# Patient Record
Sex: FEMALE | Race: OTHER
[De-identification: names, ages, dates, MRNs, and addresses within clinical notes are randomized per-mention and may not be internally consistent; named-entity substitution may affect disease eponyms.]

---

## 2020-02-15 ENCOUNTER — HOSPITAL ENCOUNTER (INPATIENT)
Dept: HOSPITAL 54 - MED | Age: 65
LOS: 5 days | Discharge: NURSING, REHAB OR LONG-TERM CARE | DRG: 854 | End: 2020-02-20
Attending: NURSE PRACTITIONER | Admitting: NURSE PRACTITIONER
Payer: MEDICARE

## 2020-02-15 VITALS — DIASTOLIC BLOOD PRESSURE: 75 MMHG | SYSTOLIC BLOOD PRESSURE: 137 MMHG

## 2020-02-15 VITALS — SYSTOLIC BLOOD PRESSURE: 117 MMHG | DIASTOLIC BLOOD PRESSURE: 71 MMHG

## 2020-02-15 VITALS — WEIGHT: 127 LBS | HEIGHT: 63 IN | BODY MASS INDEX: 22.5 KG/M2

## 2020-02-15 VITALS — DIASTOLIC BLOOD PRESSURE: 81 MMHG | SYSTOLIC BLOOD PRESSURE: 129 MMHG

## 2020-02-15 DIAGNOSIS — B95.61: ICD-10-CM

## 2020-02-15 DIAGNOSIS — F32.9: ICD-10-CM

## 2020-02-15 DIAGNOSIS — G47.00: ICD-10-CM

## 2020-02-15 DIAGNOSIS — L02.413: ICD-10-CM

## 2020-02-15 DIAGNOSIS — Z59.0: ICD-10-CM

## 2020-02-15 DIAGNOSIS — A41.9: Primary | ICD-10-CM

## 2020-02-15 DIAGNOSIS — J10.1: ICD-10-CM
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
## 2020-02-15 DIAGNOSIS — M00.9: ICD-10-CM

## 2020-02-15 DIAGNOSIS — Z83.3: ICD-10-CM

## 2020-02-15 DIAGNOSIS — E87.6: ICD-10-CM

## 2020-02-15 DIAGNOSIS — L03.113: ICD-10-CM

## 2020-02-15 DIAGNOSIS — Z80.41: ICD-10-CM

## 2020-02-15 DIAGNOSIS — Z79.899: ICD-10-CM

## 2020-02-15 DIAGNOSIS — D69.6: ICD-10-CM

## 2020-02-15 LAB
APPEARANCE UR: CLEAR
BILIRUB UR QL STRIP: NEGATIVE
COLOR UR: YELLOW
GLUCOSE UR STRIP-MCNC: NEGATIVE MG/DL
HGB UR QL STRIP: (no result) ERY/UL
KETONES UR STRIP-MCNC: NEGATIVE MG/DL
LEUKOCYTE ESTERASE UR QL STRIP: (no result)
NITRITE UR QL STRIP: POSITIVE
PH UR STRIP: 7 [PH] (ref 5–8)
PROT UR QL STRIP: (no result) MG/DL
RBC #/AREA URNS HPF: (no result) /HPF (ref 0–2)
UROBILINOGEN UR STRIP-MCNC: >=8 EU/DL
WBC #/AREA URNS HPF: (no result) /HPF
WBC #/AREA URNS HPF: (no result) /HPF (ref 0–3)

## 2020-02-15 PROCEDURE — A6403 STERILE GAUZE>16 <= 48 SQ IN: HCPCS

## 2020-02-15 PROCEDURE — A6253 ABSORPT DRG > 48 SQ IN W/O B: HCPCS

## 2020-02-15 PROCEDURE — G0378 HOSPITAL OBSERVATION PER HR: HCPCS

## 2020-02-15 PROCEDURE — A4216 STERILE WATER/SALINE, 10 ML: HCPCS

## 2020-02-15 PROCEDURE — A4217 STERILE WATER/SALINE, 500 ML: HCPCS

## 2020-02-15 PROCEDURE — A4565 SLINGS: HCPCS

## 2020-02-15 PROCEDURE — A6407 PACKING STRIPS, NON-IMPREG: HCPCS

## 2020-02-15 RX ADMIN — DEXTROSE MONOHYDRATE SCH MLS/HR: 50 INJECTION, SOLUTION INTRAVENOUS at 15:05

## 2020-02-15 RX ADMIN — SODIUM CHLORIDE PRN MLS/HR: 9 INJECTION, SOLUTION INTRAVENOUS at 14:30

## 2020-02-15 RX ADMIN — ENOXAPARIN SODIUM SCH MG: 40 INJECTION SUBCUTANEOUS at 14:35

## 2020-02-15 RX ADMIN — PIPERACILLIN SODIUM AND TAZOBACTAM SODIUM SCH MLS/HR: .25; 2 INJECTION, POWDER, LYOPHILIZED, FOR SOLUTION INTRAVENOUS at 14:32

## 2020-02-15 RX ADMIN — PIPERACILLIN SODIUM AND TAZOBACTAM SODIUM SCH MLS/HR: .25; 2 INJECTION, POWDER, LYOPHILIZED, FOR SOLUTION INTRAVENOUS at 20:57

## 2020-02-15 RX ADMIN — CITALOPRAM SCH MG: 20 TABLET ORAL at 21:37

## 2020-02-15 SDOH — ECONOMIC STABILITY - HOUSING INSECURITY: HOMELESSNESS: Z59.0

## 2020-02-15 NOTE — NUR
Recpatiencevd alert and orientated. States she spoke to the Doctor and is aware she is going for a 
procedurs for her right arm.She signed the consent Right arm is swollen and not able to take 
off her bracelet.  Will make note on the surgery check off list. She ambulated to the 
bathroom with a steady gait.  Stated she has pain in the right arm.

## 2020-02-15 NOTE — NUR
M/S RN NOTES



PATIENT AWAKE IN BED, NO RESPIRATORY DISTRESS, PAIN TOLERABLE AT THIS TIME. IV ACCESS SITE 
INTACT AND PATENT. SKIN WARM TO TOUCH. PATIENT'S NEEDS ATTENDED, BED ON LOWEST LOCKED 
POSITION, CALL LIGHT WITHIN REACH. WILL ENDORSE TO ONCOMING NURSE.

## 2020-02-16 VITALS — DIASTOLIC BLOOD PRESSURE: 85 MMHG | SYSTOLIC BLOOD PRESSURE: 137 MMHG

## 2020-02-16 VITALS — DIASTOLIC BLOOD PRESSURE: 82 MMHG | SYSTOLIC BLOOD PRESSURE: 132 MMHG

## 2020-02-16 VITALS — SYSTOLIC BLOOD PRESSURE: 160 MMHG | DIASTOLIC BLOOD PRESSURE: 99 MMHG

## 2020-02-16 LAB
BASOPHILS # BLD AUTO: 0 /CMM (ref 0–0.2)
BASOPHILS NFR BLD AUTO: 0.5 % (ref 0–2)
BUN SERPL-MCNC: 8 MG/DL (ref 7–18)
CALCIUM SERPL-MCNC: 8 MG/DL (ref 8.5–10.1)
CHLORIDE SERPL-SCNC: 103 MMOL/L (ref 98–107)
CHOLEST SERPL-MCNC: 80 MG/DL (ref ?–200)
CO2 SERPL-SCNC: 28 MMOL/L (ref 21–32)
CREAT SERPL-MCNC: 0.5 MG/DL (ref 0.6–1.3)
EOSINOPHIL NFR BLD AUTO: 2 % (ref 0–6)
GLUCOSE SERPL-MCNC: 113 MG/DL (ref 74–106)
HCT VFR BLD AUTO: 29 % (ref 33–45)
HDLC SERPL-MCNC: 26 MG/DL (ref 40–60)
HGB BLD-MCNC: 9.6 G/DL (ref 11.5–14.8)
LDLC SERPL DIRECT ASSAY-MCNC: 49 MG/DL (ref 0–99)
LYMPHOCYTES NFR BLD AUTO: 1.5 /CMM (ref 0.8–4.8)
LYMPHOCYTES NFR BLD AUTO: 21.6 % (ref 20–44)
MAGNESIUM SERPL-MCNC: 1.6 MG/DL (ref 1.8–2.4)
MCHC RBC AUTO-ENTMCNC: 34 G/DL (ref 31–36)
MCV RBC AUTO: 87 FL (ref 82–100)
MONOCYTES NFR BLD AUTO: 0.7 /CMM (ref 0.1–1.3)
MONOCYTES NFR BLD AUTO: 10.7 % (ref 2–12)
NEUTROPHILS # BLD AUTO: 4.5 /CMM (ref 1.8–8.9)
NEUTROPHILS NFR BLD AUTO: 65.2 % (ref 43–81)
PHOSPHATE SERPL-MCNC: 3.9 MG/DL (ref 2.5–4.9)
PLATELET # BLD AUTO: 479 /CMM (ref 150–450)
POTASSIUM SERPL-SCNC: 3.3 MMOL/L (ref 3.5–5.1)
RBC # BLD AUTO: 3.27 MIL/UL (ref 4–5.2)
SODIUM SERPL-SCNC: 138 MMOL/L (ref 136–145)
TRIGL SERPL-MCNC: 52 MG/DL (ref 30–150)
TSH SERPL DL<=0.005 MIU/L-ACNC: 1.07 UIU/ML (ref 0.36–3.74)
WBC NRBC COR # BLD AUTO: 6.9 K/UL (ref 4.3–11)

## 2020-02-16 PROCEDURE — 0R9L0ZZ DRAINAGE OF RIGHT ELBOW JOINT, OPEN APPROACH: ICD-10-PCS | Performed by: SPECIALIST

## 2020-02-16 PROCEDURE — 0RBJ4ZZ EXCISION OF RIGHT SHOULDER JOINT, PERCUTANEOUS ENDOSCOPIC APPROACH: ICD-10-PCS | Performed by: SPECIALIST

## 2020-02-16 RX ADMIN — Medication PRN EACH: at 18:00

## 2020-02-16 RX ADMIN — POTASSIUM CHLORIDE SCH MLS/HR: 200 INJECTION, SOLUTION INTRAVENOUS at 17:54

## 2020-02-16 RX ADMIN — POTASSIUM CHLORIDE SCH MLS/HR: 200 INJECTION, SOLUTION INTRAVENOUS at 18:56

## 2020-02-16 RX ADMIN — CITALOPRAM SCH MG: 20 TABLET ORAL at 21:58

## 2020-02-16 RX ADMIN — PIPERACILLIN SODIUM AND TAZOBACTAM SODIUM SCH MLS/HR: .25; 2 INJECTION, POWDER, LYOPHILIZED, FOR SOLUTION INTRAVENOUS at 21:58

## 2020-02-16 RX ADMIN — Medication SCH TAB: at 08:55

## 2020-02-16 RX ADMIN — MAGNESIUM SULFATE IN DEXTROSE SCH MLS/HR: 10 INJECTION, SOLUTION INTRAVENOUS at 11:17

## 2020-02-16 RX ADMIN — ENOXAPARIN SODIUM SCH MG: 40 INJECTION SUBCUTANEOUS at 08:55

## 2020-02-16 RX ADMIN — PIPERACILLIN SODIUM AND TAZOBACTAM SODIUM SCH MLS/HR: .25; 2 INJECTION, POWDER, LYOPHILIZED, FOR SOLUTION INTRAVENOUS at 13:09

## 2020-02-16 RX ADMIN — MAGNESIUM SULFATE IN DEXTROSE SCH MLS/HR: 10 INJECTION, SOLUTION INTRAVENOUS at 09:46

## 2020-02-16 RX ADMIN — Medication SCH MG: at 08:55

## 2020-02-16 RX ADMIN — PIPERACILLIN SODIUM AND TAZOBACTAM SODIUM SCH MLS/HR: .25; 2 INJECTION, POWDER, LYOPHILIZED, FOR SOLUTION INTRAVENOUS at 05:01

## 2020-02-16 RX ADMIN — OSELTAMIVIR PHOSPHATE SCH MG: 75 CAPSULE ORAL at 17:48

## 2020-02-16 RX ADMIN — PANTOPRAZOLE SODIUM SCH MG: 40 TABLET, DELAYED RELEASE ORAL at 07:30

## 2020-02-16 RX ADMIN — DEXTROSE MONOHYDRATE SCH MLS/HR: 50 INJECTION, SOLUTION INTRAVENOUS at 16:51

## 2020-02-16 RX ADMIN — NICOTINE SCH MG: 14 PATCH TRANSDERMAL at 09:15

## 2020-02-16 RX ADMIN — DEXTROSE MONOHYDRATE SCH MLS/HR: 50 INJECTION, SOLUTION INTRAVENOUS at 02:21

## 2020-02-16 NOTE — NUR
RN MS NOTES

PT IN BED, AWAKE, ALERT AND ORIENTED, NO COMPLAINT OF PAIN, NOT IN DISTRESS, CALL LIGHT 
WITHIN REACH, KEPT WARM AND COMFORTABLE IN BED, PLAN FOR SURGERY THIS AFTERNOON, PT 
INFORMED, KEPT NPO, IV FLUIDS INFUSING WELL.

## 2020-02-16 NOTE — NUR
RN MS NOTES

[facility], PT IS AWAKE, ALERT AND ORIENTED, NO COMPLAINT AT THIS 
TIME, NO BLEEDING NOTED TO SURGERY SITE, RIGHT ARM WITH VAC, NO DRAIN NOTED AT THIS TIME, 
POST OP ORDERS RECEIVED, NOTED AND CARRIED OUT, VITALS TAKEN AND RECORDED.

## 2020-02-16 NOTE — NUR
Staph Aureus of the Blood...Chaav called aat 0:45 this AM to inform me that the Patient has 
Staph Aureus of the blood...call placed to MD Cortez and informed him than call placed to  
Arianna bah message with the answering service and my number.  Patient is on Zopsyn 
and Vano.  Noted place on the front og the chart and on the surgical check list to make all 
aware.

## 2020-02-16 NOTE — NUR
MS/RN OPENING NOTES

RECEIVED PATIENT IN BED, AWAKE, ALERT.ON IV POTASSIUM INFUSING , S/P RIGHT SHOULDER SX. LEFT 
FOREARM

## 2020-02-16 NOTE — NUR
RN MS NOTES

PT IN BED, RESTING, PAIN MEDS GIVEN AS ORDERED, IV FLUIDS INFUSING WELL, SLING AT RIGHT ARM 
IN PLACE, ATE SOME DINNER, ASSISTED IN REPOSITIONING, ALL NEEDS ATTENDED.

## 2020-02-16 NOTE — NUR
MS/RN NOTES

RECEIVED NEW ORDER FOR PATIENT FOR PAIN SEVERE MORPHINE 2MG IVP ONE TIME AND PRN EVERY HOURS 
WITH CLINIDINE 0.1 MG PO IF SBP GREATER THAN 160

## 2020-02-16 NOTE — NUR
MS/RN NOTES

PATIENT REPORTED SEVERE PAIN. UNRELEIVED WITH CURRENT PAIN MEDICATION WITH ELEVATED SBP OF 
160/99.

## 2020-02-16 NOTE — NUR
RN MS NOTES

PT AWAKE, ALERT AND ORIENTED, NO COMPLAINT AT THIS TIME, BREATHING PATTERN NORMAL, DR. LICONA MADE AWARE OF PT'S BLOOD CULTURE RESULT FROM [facility], PT HAS STAPH AUREOUS SEEN AT 
HER 2 BLOOD CULTURES, PT ALSO IS POSITIVE FOR INFLUENZA A&B, MD AWARE, MALIA RIVERA MADE AWARE, 
SURGERY TO PROCEED AS PLANNED, ISOLATION PRECAUTIONS OBSERVED.

## 2020-02-16 NOTE — NUR
CLOSING NOTES:  Slept thru the night, will awaken when nurse enters room falling back to 
sleep easily.  She signed the consent the 2PM today procedure by MD Cortez.  She stated she 
understands it needs to be done.  She is pleasent and only c/o pain in the right arm X1 
which was relieved of pain by Norco..  NPO since midnight as ordered and she is aware.

## 2020-02-17 VITALS — SYSTOLIC BLOOD PRESSURE: 150 MMHG | DIASTOLIC BLOOD PRESSURE: 89 MMHG

## 2020-02-17 VITALS — DIASTOLIC BLOOD PRESSURE: 73 MMHG | SYSTOLIC BLOOD PRESSURE: 137 MMHG

## 2020-02-17 VITALS — DIASTOLIC BLOOD PRESSURE: 81 MMHG | SYSTOLIC BLOOD PRESSURE: 137 MMHG

## 2020-02-17 LAB
BASOPHILS # BLD AUTO: 0.1 /CMM (ref 0–0.2)
BASOPHILS NFR BLD AUTO: 0.7 % (ref 0–2)
BUN SERPL-MCNC: 6 MG/DL (ref 7–18)
CALCIUM SERPL-MCNC: 8.2 MG/DL (ref 8.5–10.1)
CHLORIDE SERPL-SCNC: 99 MMOL/L (ref 98–107)
CO2 SERPL-SCNC: 25 MMOL/L (ref 21–32)
CREAT SERPL-MCNC: 0.6 MG/DL (ref 0.6–1.3)
EOSINOPHIL NFR BLD AUTO: 1.3 % (ref 0–6)
GLUCOSE SERPL-MCNC: 106 MG/DL (ref 74–106)
HCT VFR BLD AUTO: 30 % (ref 33–45)
HGB BLD-MCNC: 10.1 G/DL (ref 11.5–14.8)
LYMPHOCYTES NFR BLD AUTO: 1.3 /CMM (ref 0.8–4.8)
LYMPHOCYTES NFR BLD AUTO: 17 % (ref 20–44)
MAGNESIUM SERPL-MCNC: 1.7 MG/DL (ref 1.8–2.4)
MCHC RBC AUTO-ENTMCNC: 34 G/DL (ref 31–36)
MCV RBC AUTO: 88 FL (ref 82–100)
MONOCYTES NFR BLD AUTO: 0.7 /CMM (ref 0.1–1.3)
MONOCYTES NFR BLD AUTO: 8.7 % (ref 2–12)
NEUTROPHILS # BLD AUTO: 5.6 /CMM (ref 1.8–8.9)
NEUTROPHILS NFR BLD AUTO: 72.3 % (ref 43–81)
PHOSPHATE SERPL-MCNC: 3.9 MG/DL (ref 2.5–4.9)
PLATELET # BLD AUTO: 499 /CMM (ref 150–450)
POTASSIUM SERPL-SCNC: 3.5 MMOL/L (ref 3.5–5.1)
RBC # BLD AUTO: 3.42 MIL/UL (ref 4–5.2)
SODIUM SERPL-SCNC: 135 MMOL/L (ref 136–145)
WBC NRBC COR # BLD AUTO: 7.7 K/UL (ref 4.3–11)

## 2020-02-17 RX ADMIN — Medication SCH TAB: at 08:34

## 2020-02-17 RX ADMIN — Medication PRN MG: at 09:31

## 2020-02-17 RX ADMIN — DEXTROSE MONOHYDRATE SCH MLS/HR: 50 INJECTION, SOLUTION INTRAVENOUS at 22:06

## 2020-02-17 RX ADMIN — DEXTROSE MONOHYDRATE SCH MLS/HR: 50 INJECTION, SOLUTION INTRAVENOUS at 02:59

## 2020-02-17 RX ADMIN — DEXTROSE MONOHYDRATE SCH MLS/HR: 50 INJECTION, SOLUTION INTRAVENOUS at 15:16

## 2020-02-17 RX ADMIN — ENOXAPARIN SODIUM SCH MG: 40 INJECTION SUBCUTANEOUS at 08:36

## 2020-02-17 RX ADMIN — NICOTINE SCH MG: 14 PATCH TRANSDERMAL at 08:34

## 2020-02-17 RX ADMIN — Medication PRN MG: at 03:39

## 2020-02-17 RX ADMIN — SODIUM CHLORIDE PRN MLS/HR: 9 INJECTION, SOLUTION INTRAVENOUS at 03:38

## 2020-02-17 RX ADMIN — PANTOPRAZOLE SODIUM SCH MG: 40 TABLET, DELAYED RELEASE ORAL at 08:34

## 2020-02-17 RX ADMIN — OSELTAMIVIR PHOSPHATE SCH MG: 75 CAPSULE ORAL at 17:21

## 2020-02-17 RX ADMIN — Medication SCH MG: at 08:34

## 2020-02-17 RX ADMIN — OSELTAMIVIR PHOSPHATE SCH MG: 75 CAPSULE ORAL at 08:34

## 2020-02-17 RX ADMIN — Medication PRN EACH: at 20:47

## 2020-02-17 RX ADMIN — MAGNESIUM SULFATE IN DEXTROSE SCH MLS/HR: 10 INJECTION, SOLUTION INTRAVENOUS at 10:52

## 2020-02-17 RX ADMIN — PIPERACILLIN SODIUM AND TAZOBACTAM SODIUM SCH MLS/HR: .25; 2 INJECTION, POWDER, LYOPHILIZED, FOR SOLUTION INTRAVENOUS at 20:48

## 2020-02-17 RX ADMIN — MAGNESIUM SULFATE IN DEXTROSE SCH MLS/HR: 10 INJECTION, SOLUTION INTRAVENOUS at 09:30

## 2020-02-17 RX ADMIN — CITALOPRAM SCH MG: 20 TABLET ORAL at 20:46

## 2020-02-17 RX ADMIN — Medication PRN MG: at 15:28

## 2020-02-17 RX ADMIN — PIPERACILLIN SODIUM AND TAZOBACTAM SODIUM SCH MLS/HR: .25; 2 INJECTION, POWDER, LYOPHILIZED, FOR SOLUTION INTRAVENOUS at 12:53

## 2020-02-17 RX ADMIN — PIPERACILLIN SODIUM AND TAZOBACTAM SODIUM SCH MLS/HR: .25; 2 INJECTION, POWDER, LYOPHILIZED, FOR SOLUTION INTRAVENOUS at 04:52

## 2020-02-17 NOTE — NUR
MS/RN NOTES RECEIVED LAB RESULT PRELIMERARY BLOOD CX

GRAM POSITIVE COCCI IN CLUSTER PER  SANDRA REPORTED. TO FOLLOW UP WITH MD.

## 2020-02-17 NOTE — NUR
MS RN NOTES 



PATIENT IN BED SLEEPING. NO SOB OR ACUTE DISTRESS NOTED. ALL DUE  MEDICATIONS ADMINISTERED. 
ALL NEEDS MET. PAIN WAS CONTROLLED WITH MEDICATIONS. WILL ENDORSE CARE TO PM SHIFT.

## 2020-02-17 NOTE — NUR
MS RN NOTES 



PATIENT WAS SEEN BY MALIA RIVERA AND DRESSING WAS CHANGED. PATIENT TOLERATED PROCEDURE WELL. 
WILL CONTINUE TO MONITOR.

## 2020-02-17 NOTE — NUR
MS RN NOTES 



PATIENT IN BED RESTING NO SOB OR ACUTE DISTRESS NOTED. PERIPHERAL IV INTACT PATENT. PATIENT 
ALERT, ORIENTED X3. BED IN LOW LOCKED POSITION. CALL LIGHT WITHIN REACH. WILL CONTINUE TO 
MONITOR.

## 2020-02-17 NOTE — NUR
322-1 MS/RN CLOSING NOTED

PATIENT ALERT, ORIENTED X3, ABLE TO VERBALIZE NEEDS, ON PAIN MANAGEMENT MONITORING,

ATTENDED TO ALL NEEDS, ON DROPLET AND CONTACT PROTOCOL FOLLOWED, INSTRUCTED ON PROPER 

HYGIENE AND HANDWASHING, INFECTION CONTROL MEASURES. RESPIRATIONS EVEN AND UNLABORED, ON IV 
ANTIBIOTIC MEDICATIONS

WILL ENDORSE TO AMRN FOR NOAM. BED LOCKED, CALL LIGHTS WITHIN REACH.

## 2020-02-18 VITALS — DIASTOLIC BLOOD PRESSURE: 72 MMHG | SYSTOLIC BLOOD PRESSURE: 112 MMHG

## 2020-02-18 VITALS — SYSTOLIC BLOOD PRESSURE: 116 MMHG | DIASTOLIC BLOOD PRESSURE: 72 MMHG

## 2020-02-18 VITALS — SYSTOLIC BLOOD PRESSURE: 125 MMHG | DIASTOLIC BLOOD PRESSURE: 75 MMHG

## 2020-02-18 LAB
BASOPHILS # BLD AUTO: 0 /CMM (ref 0–0.2)
BASOPHILS NFR BLD AUTO: 0.7 % (ref 0–2)
BUN SERPL-MCNC: 6 MG/DL (ref 7–18)
CALCIUM SERPL-MCNC: 8.1 MG/DL (ref 8.5–10.1)
CHLORIDE SERPL-SCNC: 102 MMOL/L (ref 98–107)
CO2 SERPL-SCNC: 30 MMOL/L (ref 21–32)
CREAT SERPL-MCNC: 0.7 MG/DL (ref 0.6–1.3)
EOSINOPHIL NFR BLD AUTO: 6.9 % (ref 0–6)
GLUCOSE SERPL-MCNC: 193 MG/DL (ref 74–106)
HCT VFR BLD AUTO: 30 % (ref 33–45)
HGB BLD-MCNC: 9.8 G/DL (ref 11.5–14.8)
LYMPHOCYTES NFR BLD AUTO: 1 /CMM (ref 0.8–4.8)
LYMPHOCYTES NFR BLD AUTO: 18.4 % (ref 20–44)
MAGNESIUM SERPL-MCNC: 2 MG/DL (ref 1.8–2.4)
MCHC RBC AUTO-ENTMCNC: 33 G/DL (ref 31–36)
MCV RBC AUTO: 87 FL (ref 82–100)
MONOCYTES NFR BLD AUTO: 0.5 /CMM (ref 0.1–1.3)
MONOCYTES NFR BLD AUTO: 8.8 % (ref 2–12)
NEUTROPHILS # BLD AUTO: 3.6 /CMM (ref 1.8–8.9)
NEUTROPHILS NFR BLD AUTO: 65.2 % (ref 43–81)
PHOSPHATE SERPL-MCNC: 3.7 MG/DL (ref 2.5–4.9)
PLATELET # BLD AUTO: 418 /CMM (ref 150–450)
POTASSIUM SERPL-SCNC: 2.7 MMOL/L (ref 3.5–5.1)
RBC # BLD AUTO: 3.46 MIL/UL (ref 4–5.2)
SODIUM SERPL-SCNC: 138 MMOL/L (ref 136–145)
WBC NRBC COR # BLD AUTO: 5.5 K/UL (ref 4.3–11)

## 2020-02-18 RX ADMIN — POTASSIUM CHLORIDE SCH MLS/HR: 200 INJECTION, SOLUTION INTRAVENOUS at 12:38

## 2020-02-18 RX ADMIN — Medication SCH TAB: at 08:32

## 2020-02-18 RX ADMIN — POTASSIUM CHLORIDE SCH MLS/HR: 200 INJECTION, SOLUTION INTRAVENOUS at 11:34

## 2020-02-18 RX ADMIN — PIPERACILLIN SODIUM AND TAZOBACTAM SODIUM SCH MLS/HR: .25; 2 INJECTION, POWDER, LYOPHILIZED, FOR SOLUTION INTRAVENOUS at 12:32

## 2020-02-18 RX ADMIN — DEXTROSE MONOHYDRATE SCH MLS/HR: 50 INJECTION, SOLUTION INTRAVENOUS at 22:42

## 2020-02-18 RX ADMIN — PIPERACILLIN SODIUM AND TAZOBACTAM SODIUM SCH MLS/HR: .25; 2 INJECTION, POWDER, LYOPHILIZED, FOR SOLUTION INTRAVENOUS at 20:38

## 2020-02-18 RX ADMIN — Medication PRN EACH: at 12:43

## 2020-02-18 RX ADMIN — NICOTINE SCH MG: 14 PATCH TRANSDERMAL at 08:32

## 2020-02-18 RX ADMIN — ALUMINUM HYDROXIDE, MAGNESIUM HYDROXIDE, AND SIMETHICONE PRN ML: 200; 200; 20 SUSPENSION ORAL at 04:55

## 2020-02-18 RX ADMIN — OSELTAMIVIR PHOSPHATE SCH MG: 75 CAPSULE ORAL at 08:33

## 2020-02-18 RX ADMIN — ENOXAPARIN SODIUM SCH MG: 40 INJECTION SUBCUTANEOUS at 08:32

## 2020-02-18 RX ADMIN — OSELTAMIVIR PHOSPHATE SCH MG: 75 CAPSULE ORAL at 16:19

## 2020-02-18 RX ADMIN — Medication PRN EACH: at 21:13

## 2020-02-18 RX ADMIN — PIPERACILLIN SODIUM AND TAZOBACTAM SODIUM SCH MLS/HR: .25; 2 INJECTION, POWDER, LYOPHILIZED, FOR SOLUTION INTRAVENOUS at 04:47

## 2020-02-18 RX ADMIN — POTASSIUM CHLORIDE SCH MLS/HR: 200 INJECTION, SOLUTION INTRAVENOUS at 15:04

## 2020-02-18 RX ADMIN — SODIUM CHLORIDE PRN MLS/HR: 9 INJECTION, SOLUTION INTRAVENOUS at 02:56

## 2020-02-18 RX ADMIN — PANTOPRAZOLE SODIUM SCH MG: 40 TABLET, DELAYED RELEASE ORAL at 08:43

## 2020-02-18 RX ADMIN — DEXTROSE MONOHYDRATE SCH MLS/HR: 50 INJECTION, SOLUTION INTRAVENOUS at 13:15

## 2020-02-18 RX ADMIN — CITALOPRAM SCH MG: 20 TABLET ORAL at 21:13

## 2020-02-18 RX ADMIN — Medication PRN EACH: at 04:55

## 2020-02-18 RX ADMIN — DEXTROSE MONOHYDRATE SCH MLS/HR: 50 INJECTION, SOLUTION INTRAVENOUS at 06:12

## 2020-02-18 RX ADMIN — Medication SCH MG: at 08:32

## 2020-02-18 RX ADMIN — POTASSIUM CHLORIDE SCH MLS/HR: 200 INJECTION, SOLUTION INTRAVENOUS at 13:59

## 2020-02-18 NOTE — NUR
MS RN CLOSING NOTES



PATIENT IN BED WATCHING TV AT THIS TIME. A/O X4. ABLE TO MAKE NEEDS KNOWN. PT WITH RIGHT ARM 
SLING IN PLACE WITH DRESSING ON SHOULDER AND ARM C/D/I. DROPLET PRECAUTIONS MAINTAINED FOR 
FLU. ON ROOM AIR, TOLERATING WELL WITH NO SOB NOTED. PT WITH TWO PIV'S: RAC G# 22 AND LEFT 
WRIST G#22, BOTH INTACT AND PATENT, IVF OF NS @ 75ML/HR INFUSING WELL TO LEFT WRIST, NO S/S 
OF INFILTRATIONS NOTED. BED IN LOWEST LOCKED POSITION WITH SR UP X2.  CALL LIGHT WITHIN 
REACH.  ALL NEEDS AND CARE ATTENDED WELL. WILL ENDORSE TO NIGHT SHIFT NURSE FOR NOAM.

## 2020-02-18 NOTE — NUR
RN NOTES 



RECEIVED CALL FROM LAB [facility] AND REPORTED THAT PT HAS CRITICAL LOW  LEVEL  POTASSIUM 
2.7. DR MARINA MADE AWARE WITH ORDER TO ADMINISTER POTASSIUM 40MEQ IV AND KDUR 40MEQ PO. WILL 
CARRY OUT ORDER.

## 2020-02-18 NOTE — NUR
MS RN OPENING NOTES



PATIENT RECEIVED AWAKE IN BED IN NO ACUTE SIGN SO DISTRESS. A/O X4, ABLE TO MAKE NEEDS 
KNOWN, DENIES PAIN OR ANY DISCOMFORTS AT THIS TIME. PT WITH RIGHT ARM SLING IN PLACE. ON 
ROOM AIR, RESPIRATIONS EVEN AND UNLABORED. IV ACCESS ON RAC INTACT AND PATENT, IVF OF NS @ 
75ML/HR INFUSING WELL. SAFETY MEASURES IN PLACE: BED IN LOW LOCKED POSITION WITH SR UP X2. 
CALL LIGHT WITHIN REACH. WILL CONTINUE TO MONITOR.

## 2020-02-18 NOTE — NUR
MS RN NOTES



PATIENT AWAKE, WASHING FACE. ALERT AND ORIENTED X 4. BREATHING EVEN AND UNLABORED ON ROOM 
AIR. SHOWS NO SIGNS OF ACUTE RESPIRATORY DISTRESS, NO ACUTE PAIN. IV ON LAC 22G AND L WRIST 
22G. RUNNING NS AT 75ML/HR SHOWS NO SIGNS OF INFILTRATION, NO REDNESS. DRESSING ON R 
SHOULDER IS INTACT, TO BE CHANGED BY MD ONLY. SAFETY PRECAUTIONS IN PLACE. BED IN LOWEST 
POSITION, LOCKED, AND CALL LIGHT KEPT WITHIN REACH. WILL CONTINUE TO MONITOR.

## 2020-02-18 NOTE — NUR
MS RN CLOSING NOTES: PATIENT IN BED, ASLEEP. AMBULATORY. NO SOB NOTED. RESTED THROUGHOUT THE 
NIGHT. DROPLET ISOLATION OBSERVED AT ALL TIMES. CALL LIGHT WITHIN REACH. SLING ON THE RIGHT 
ARM AT ALL TIMES. POST OP DRESSINGS ON THE RIGHT ARM AND SHOULDER ARE DRY AND INTACT. BED IN 
LOWEST AND LOCKED POSITION.

## 2020-02-19 VITALS — SYSTOLIC BLOOD PRESSURE: 145 MMHG | DIASTOLIC BLOOD PRESSURE: 95 MMHG

## 2020-02-19 VITALS — DIASTOLIC BLOOD PRESSURE: 91 MMHG | SYSTOLIC BLOOD PRESSURE: 149 MMHG

## 2020-02-19 VITALS — SYSTOLIC BLOOD PRESSURE: 156 MMHG | DIASTOLIC BLOOD PRESSURE: 80 MMHG

## 2020-02-19 LAB
BUN SERPL-MCNC: 9 MG/DL (ref 7–18)
CALCIUM SERPL-MCNC: 8.7 MG/DL (ref 8.5–10.1)
CHLORIDE SERPL-SCNC: 105 MMOL/L (ref 98–107)
CO2 SERPL-SCNC: 24 MMOL/L (ref 21–32)
CREAT SERPL-MCNC: 0.9 MG/DL (ref 0.6–1.3)
GLUCOSE SERPL-MCNC: 111 MG/DL (ref 74–106)
POTASSIUM SERPL-SCNC: 4.1 MMOL/L (ref 3.5–5.1)
SODIUM SERPL-SCNC: 140 MMOL/L (ref 136–145)

## 2020-02-19 RX ADMIN — PIPERACILLIN SODIUM AND TAZOBACTAM SODIUM SCH MLS/HR: .25; 2 INJECTION, POWDER, LYOPHILIZED, FOR SOLUTION INTRAVENOUS at 05:12

## 2020-02-19 RX ADMIN — Medication SCH MG: at 08:20

## 2020-02-19 RX ADMIN — Medication PRN EACH: at 21:09

## 2020-02-19 RX ADMIN — Medication SCH TAB: at 08:20

## 2020-02-19 RX ADMIN — CITALOPRAM SCH MG: 20 TABLET ORAL at 21:08

## 2020-02-19 RX ADMIN — OSELTAMIVIR PHOSPHATE SCH MG: 75 CAPSULE ORAL at 08:20

## 2020-02-19 RX ADMIN — PANTOPRAZOLE SODIUM SCH MG: 40 TABLET, DELAYED RELEASE ORAL at 08:20

## 2020-02-19 RX ADMIN — NICOTINE SCH MG: 14 PATCH TRANSDERMAL at 08:20

## 2020-02-19 RX ADMIN — Medication PRN EACH: at 11:48

## 2020-02-19 RX ADMIN — OSELTAMIVIR PHOSPHATE SCH MG: 75 CAPSULE ORAL at 17:46

## 2020-02-19 RX ADMIN — DEXTROSE MONOHYDRATE SCH MLS/HR: 50 INJECTION, SOLUTION INTRAVENOUS at 20:48

## 2020-02-19 RX ADMIN — DEXTROSE MONOHYDRATE SCH MLS/HR: 50 INJECTION, SOLUTION INTRAVENOUS at 12:41

## 2020-02-19 RX ADMIN — ENOXAPARIN SODIUM SCH MG: 40 INJECTION SUBCUTANEOUS at 08:21

## 2020-02-19 RX ADMIN — SODIUM CHLORIDE PRN MLS/HR: 9 INJECTION, SOLUTION INTRAVENOUS at 05:32

## 2020-02-19 RX ADMIN — DEXTROSE MONOHYDRATE SCH MLS/HR: 50 INJECTION, SOLUTION INTRAVENOUS at 08:09

## 2020-02-19 RX ADMIN — ALUMINUM HYDROXIDE, MAGNESIUM HYDROXIDE, AND SIMETHICONE PRN ML: 200; 200; 20 SUSPENSION ORAL at 23:09

## 2020-02-19 NOTE — NUR
RN MS NOTES

PT IN BED, ASLEEP, EASY TO AROUSE, ALERT AND ORIENTED, NO COMPLAINT OF PAIN AT THIS TIME, 
RESPIRATIONS NORMAL, IV FLUIDS INFUSING WELL, CALL LIGHT WITHIN REACH, KEEP COMFORTABLE IN 
BED, ISOLATION PRECAUTIONS OBSERVED.

## 2020-02-19 NOTE — NUR
RN MS NOTES

PT IN BED, AWAKE, ALERT AND ORIENTED, NO COMPLAINT OF PAIN AT THIS TIME, RESPIRATIONS 
NORMAL, CALL LIGHT WITHIN REACH, DUE MEDS GIVEN AS ORDERED, AMBULATES TO THE BATHROOM WITH 
STEADY GAIT, PM CARE PROVIDED, ALL NEEDS ATTENDED.

## 2020-02-19 NOTE — NUR
RN MS NOTES

PT SEEN AND EXAMINED BY DR. MARINA, PLAN OF CARE DISCUSSED WITH PT, VERBALIZED UNDERSTANDING.

## 2020-02-19 NOTE — NUR
MS RN NOTES



PATIENT IN BED, ASLEEP,  ALERT AND ORIENTED X 4. BREATHING EVEN AND UNLABORED ON ROOM AIR. 
SHOWS NO SIGNS OF ACUTE RESPIRATORY DISTRESS, NO ACUTE PAIN. IV ON LAC 22G AND L WRIST 22G. 
RUNNING NS AT 75ML/HR SHOWS NO SIGNS OF INFILTRATION, NO REDNESS. DRESSING ON R SHOULDER IS 
INTACT, TO BE CHANGED BY MD ONLY. ALL DUE MEDICATIONS GIVEN. SAFETY PRECAUTIONS IN PLACE. 
BED IN LOWEST POSITION, LOCKED, AND CALL LIGHT KEPT WITHIN REACH. WILL ENDORSE TO ONCOMING 
NURSE.

## 2020-02-19 NOTE — NUR
MS RN NOTES



PATIENT IN BED, ASLEEP,  ALERT AND ORIENTED X 4. BREATHING EVEN AND UNLABORED ON ROOM AIR. 
SHOWS NO SIGNS OF ACUTE RESPIRATORY DISTRESS, NO ACUTE PAIN. IV ON LAC 22G AND L WRIST 22G. 
RUNNING NS AT 75ML/HR SHOWS NO SIGNS OF INFILTRATION, NO REDNESS. DRESSING ON R SHOULDER IS 
INTACT, CHANGED BY MD TODAY. ALL DUE MEDICATIONS GIVEN. CALL LIGHT KEPT WITHIN REACH. WILL 
CONTINUE TO MONITOR.

## 2020-02-19 NOTE — NUR
RN MS NOTES

PT SEEN BY MALIA RIVERA, WOUND CARE AND DRESSING CHANGE DONE TO RIGHT ARM, TOLERATED WELL, DUE 
MEDS GIVEN AS ORDERED, NEEDS ATTENDED.

## 2020-02-20 VITALS — DIASTOLIC BLOOD PRESSURE: 94 MMHG | SYSTOLIC BLOOD PRESSURE: 145 MMHG

## 2020-02-20 LAB
BASOPHILS # BLD AUTO: 0 /CMM (ref 0–0.2)
BASOPHILS NFR BLD AUTO: 0.5 % (ref 0–2)
BUN SERPL-MCNC: 8 MG/DL (ref 7–18)
CALCIUM SERPL-MCNC: 8.5 MG/DL (ref 8.5–10.1)
CHLORIDE SERPL-SCNC: 104 MMOL/L (ref 98–107)
CO2 SERPL-SCNC: 29 MMOL/L (ref 21–32)
CREAT SERPL-MCNC: 0.9 MG/DL (ref 0.6–1.3)
EOSINOPHIL NFR BLD AUTO: 3.4 % (ref 0–6)
GLUCOSE SERPL-MCNC: 92 MG/DL (ref 74–106)
HCT VFR BLD AUTO: 28 % (ref 33–45)
HGB BLD-MCNC: 9.3 G/DL (ref 11.5–14.8)
LYMPHOCYTES NFR BLD AUTO: 1.4 /CMM (ref 0.8–4.8)
LYMPHOCYTES NFR BLD AUTO: 20.7 % (ref 20–44)
MAGNESIUM SERPL-MCNC: 1.8 MG/DL (ref 1.8–2.4)
MCHC RBC AUTO-ENTMCNC: 33 G/DL (ref 31–36)
MCV RBC AUTO: 87 FL (ref 82–100)
MONOCYTES NFR BLD AUTO: 0.7 /CMM (ref 0.1–1.3)
MONOCYTES NFR BLD AUTO: 10.2 % (ref 2–12)
NEUTROPHILS # BLD AUTO: 4.5 /CMM (ref 1.8–8.9)
NEUTROPHILS NFR BLD AUTO: 65.2 % (ref 43–81)
PHOSPHATE SERPL-MCNC: 4.6 MG/DL (ref 2.5–4.9)
PLATELET # BLD AUTO: 468 /CMM (ref 150–450)
POTASSIUM SERPL-SCNC: 3.8 MMOL/L (ref 3.5–5.1)
RBC # BLD AUTO: 3.23 MIL/UL (ref 4–5.2)
SODIUM SERPL-SCNC: 140 MMOL/L (ref 136–145)
WBC NRBC COR # BLD AUTO: 7 K/UL (ref 4.3–11)

## 2020-02-20 RX ADMIN — DEXTROSE MONOHYDRATE SCH MLS/HR: 50 INJECTION, SOLUTION INTRAVENOUS at 13:21

## 2020-02-20 RX ADMIN — NICOTINE SCH MG: 14 PATCH TRANSDERMAL at 08:57

## 2020-02-20 RX ADMIN — SODIUM CHLORIDE PRN MLS/HR: 9 INJECTION, SOLUTION INTRAVENOUS at 13:30

## 2020-02-20 RX ADMIN — Medication SCH TAB: at 08:57

## 2020-02-20 RX ADMIN — DEXTROSE MONOHYDRATE SCH MLS/HR: 50 INJECTION, SOLUTION INTRAVENOUS at 04:49

## 2020-02-20 RX ADMIN — PANTOPRAZOLE SODIUM SCH MG: 40 TABLET, DELAYED RELEASE ORAL at 08:57

## 2020-02-20 RX ADMIN — ENOXAPARIN SODIUM SCH MG: 40 INJECTION SUBCUTANEOUS at 09:04

## 2020-02-20 RX ADMIN — OSELTAMIVIR PHOSPHATE SCH MG: 75 CAPSULE ORAL at 08:57

## 2020-02-20 RX ADMIN — Medication PRN EACH: at 16:35

## 2020-02-20 RX ADMIN — Medication SCH MG: at 08:57

## 2020-02-20 NOTE — NUR
MS RN NOTES



PATIENT IN BED, ASLEEP,  ALERT AND ORIENTED X 4. BREATHING EVEN AND UNLABORED ON ROOM AIR. 
SHOWS NO SIGNS OF ACUTE RESPIRATORY DISTRESS, NO ACUTE PAIN. IV ON L FOREARM 22G RUNNING NS 
AT 75ML/HR SHOWS NO SIGNS OF INFILTRATION, NO REDNESS. DRESSING ON R SHOULDER IS INTACT, 
CHANGED BY MD 2/19. ALL DUE MEDICATIONS GIVEN. CALL LIGHT KEPT WITHIN REACH. WILL ENDORSE TO 
ONCOMING NURSE.

## 2020-02-20 NOTE — NUR
RN MS NOTES

PT AWAKE, ALERT AND ORIENTED, DENIES PAIN AT THIS TIME, RESPIRATIONS NORMAL, NOT IN 
DISTRESS, IV FLUIDS INFUSING WELL, CALL LIGHT WITHIN REACH, SLING IN PLACE AT RIGHT ARM, 
NEEDS ATTENDED.

## 2020-02-20 NOTE — NUR
RN MS NOTES

PT IN BED, AWAKE, ALERT AND ORIENTED, ASKED FOR PAIN MEDICATION FOR RIGHT ARM PAIN, GIVEN AS 
ORDERED, NOT IN DISTRESS, DISCHARGE ORDER GIVEN BY DR. MARINA, PT CLEARED FROM ISOLATION FOR 
FLU, DISCHARGE AND MEDICATION INSTRUCTIONS PROVIDED TO PT, VERBALIZED UNDERSTANDING, PT SEEN 
BY BRAYDON RIVERA FOR ORTHO, RIGHT SHOULDER SURGERY SITE DRESSING CHANGED, PT REFUSED SKIN PHOTOS, 
REPORT GIVEN TO [facility], BELONGINGS ACCOUNTED FOR, PICKED UP BY 2 
AMBULANCE PERSONNEL, LEFT VIA GUERNEY IN STABLE CONDITION.